# Patient Record
Sex: MALE | Race: WHITE | Employment: FULL TIME | URBAN - METROPOLITAN AREA
[De-identification: names, ages, dates, MRNs, and addresses within clinical notes are randomized per-mention and may not be internally consistent; named-entity substitution may affect disease eponyms.]

---

## 2021-09-17 ENCOUNTER — ANESTHESIA EVENT (OUTPATIENT)
Dept: ENDOSCOPY | Age: 51
End: 2021-09-17
Payer: COMMERCIAL

## 2021-09-20 ENCOUNTER — HOSPITAL ENCOUNTER (OUTPATIENT)
Age: 51
Setting detail: OUTPATIENT SURGERY
Discharge: HOME | End: 2021-09-20
Attending: INTERNAL MEDICINE | Admitting: INTERNAL MEDICINE
Payer: COMMERCIAL

## 2021-09-20 ENCOUNTER — ANESTHESIA (OUTPATIENT)
Dept: ENDOSCOPY | Age: 51
End: 2021-09-20
Payer: COMMERCIAL

## 2021-09-20 VITALS — SYSTOLIC BLOOD PRESSURE: 145 MMHG | DIASTOLIC BLOOD PRESSURE: 79 MMHG | OXYGEN SATURATION: 99 %

## 2021-09-20 VITALS
DIASTOLIC BLOOD PRESSURE: 73 MMHG | TEMPERATURE: 98.1 F | BODY MASS INDEX: 37.22 KG/M2 | WEIGHT: 260 LBS | HEART RATE: 85 BPM | SYSTOLIC BLOOD PRESSURE: 132 MMHG | OXYGEN SATURATION: 98 % | RESPIRATION RATE: 18 BRPM | HEIGHT: 70 IN

## 2021-09-20 DIAGNOSIS — K92.1 MELENA: ICD-10-CM

## 2021-09-20 DIAGNOSIS — C14.0 THROAT CANCER (HCC): ICD-10-CM

## 2021-09-20 DIAGNOSIS — D50.9 IRON DEFICIENCY ANEMIA, UNSPECIFIED IRON DEFICIENCY ANEMIA TYPE: ICD-10-CM

## 2021-09-20 DIAGNOSIS — R19.7 DIARRHEA, UNSPECIFIED TYPE: ICD-10-CM

## 2021-09-20 DIAGNOSIS — R12 HEARTBURN: ICD-10-CM

## 2021-09-20 LAB
GLUCOSE BLD-MCNC: 187 MG/DL (ref 70–99)
PERFORMED ON: ABNORMAL
SARS-COV-2, NAAT: NOT DETECTED

## 2021-09-20 PROCEDURE — 3700000001 HC ADD 15 MINUTES (ANESTHESIA): Performed by: INTERNAL MEDICINE

## 2021-09-20 PROCEDURE — 3700000000 HC ANESTHESIA ATTENDED CARE: Performed by: INTERNAL MEDICINE

## 2021-09-20 PROCEDURE — 3609012400 HC EGD TRANSORAL BIOPSY SINGLE/MULTIPLE: Performed by: INTERNAL MEDICINE

## 2021-09-20 PROCEDURE — 88305 TISSUE EXAM BY PATHOLOGIST: CPT

## 2021-09-20 PROCEDURE — 87635 SARS-COV-2 COVID-19 AMP PRB: CPT

## 2021-09-20 PROCEDURE — 2580000003 HC RX 258: Performed by: ANESTHESIOLOGY

## 2021-09-20 PROCEDURE — 7100000010 HC PHASE II RECOVERY - FIRST 15 MIN: Performed by: INTERNAL MEDICINE

## 2021-09-20 PROCEDURE — 3609027000 HC COLONOSCOPY: Performed by: INTERNAL MEDICINE

## 2021-09-20 PROCEDURE — 6360000002 HC RX W HCPCS: Performed by: NURSE ANESTHETIST, CERTIFIED REGISTERED

## 2021-09-20 PROCEDURE — 2709999900 HC NON-CHARGEABLE SUPPLY: Performed by: INTERNAL MEDICINE

## 2021-09-20 PROCEDURE — 7100000011 HC PHASE II RECOVERY - ADDTL 15 MIN: Performed by: INTERNAL MEDICINE

## 2021-09-20 RX ORDER — SODIUM CHLORIDE, SODIUM LACTATE, POTASSIUM CHLORIDE, CALCIUM CHLORIDE 600; 310; 30; 20 MG/100ML; MG/100ML; MG/100ML; MG/100ML
INJECTION, SOLUTION INTRAVENOUS CONTINUOUS
Status: DISCONTINUED | OUTPATIENT
Start: 2021-09-20 | End: 2021-09-20 | Stop reason: HOSPADM

## 2021-09-20 RX ORDER — INSULIN GLARGINE 100 [IU]/ML
INJECTION, SOLUTION SUBCUTANEOUS NIGHTLY
COMMUNITY

## 2021-09-20 RX ORDER — PROPOFOL 10 MG/ML
INJECTION, EMULSION INTRAVENOUS PRN
Status: DISCONTINUED | OUTPATIENT
Start: 2021-09-20 | End: 2021-09-20 | Stop reason: SDUPTHER

## 2021-09-20 RX ORDER — M-VIT,TX,IRON,MINS/CALC/FOLIC 27MG-0.4MG
1 TABLET ORAL DAILY
COMMUNITY

## 2021-09-20 RX ORDER — LIDOCAINE HYDROCHLORIDE 20 MG/ML
INJECTION, SOLUTION INTRAVENOUS PRN
Status: DISCONTINUED | OUTPATIENT
Start: 2021-09-20 | End: 2021-09-20 | Stop reason: SDUPTHER

## 2021-09-20 RX ORDER — LISINOPRIL 5 MG/1
5 TABLET ORAL DAILY
COMMUNITY

## 2021-09-20 RX ADMIN — LIDOCAINE HYDROCHLORIDE 100 MG: 20 INJECTION, SOLUTION INTRAVENOUS at 10:31

## 2021-09-20 RX ADMIN — PROPOFOL 100 MG: 10 INJECTION, EMULSION INTRAVENOUS at 10:43

## 2021-09-20 RX ADMIN — SODIUM CHLORIDE, POTASSIUM CHLORIDE, SODIUM LACTATE AND CALCIUM CHLORIDE: 600; 310; 30; 20 INJECTION, SOLUTION INTRAVENOUS at 08:54

## 2021-09-20 RX ADMIN — PROPOFOL 100 MG: 10 INJECTION, EMULSION INTRAVENOUS at 10:46

## 2021-09-20 RX ADMIN — PROPOFOL 200 MG: 10 INJECTION, EMULSION INTRAVENOUS at 10:31

## 2021-09-20 RX ADMIN — PROPOFOL 200 MG: 10 INJECTION, EMULSION INTRAVENOUS at 10:33

## 2021-09-20 ASSESSMENT — PAIN - FUNCTIONAL ASSESSMENT
PAIN_FUNCTIONAL_ASSESSMENT: 0-10

## 2021-09-20 ASSESSMENT — PULMONARY FUNCTION TESTS
PIF_VALUE: 1

## 2021-09-20 ASSESSMENT — LIFESTYLE VARIABLES: SMOKING_STATUS: 0

## 2021-09-20 NOTE — ANESTHESIA PRE PROCEDURE
Department of Anesthesiology  Preprocedure Note       Name:  Fito Meade   Age:  44 y.o.  :  1970                                          MRN:  [id number]         Date:  2021      Surgeon: Delores Traylor):  Carlos Munoz MD    Procedure: Procedure(s):  ESOPHAGOGASTRODUODENOSCOPY  COLONOSCOPY DIAGNOSTIC    Medications prior to admission:   Prior to Admission medications    Not on File       Current medications:    Current Facility-Administered Medications   Medication Dose Route Frequency Provider Last Rate Last Admin    lactated ringers infusion   IntraVENous Continuous Marten Shasih, DO        lactated ringers infusion   IntraVENous Continuous Marten Shashi, DO           Allergies:  No Known Allergies    Problem List:  There is no problem list on file for this patient. Past Medical History:  No past medical history on file. Past Surgical History:  No past surgical history on file. Social History:    Social History     Tobacco Use    Smoking status: Not on file   Substance Use Topics    Alcohol use: Not on file                                Counseling given: Not Answered      Vital Signs (Current): There were no vitals filed for this visit. BP Readings from Last 3 Encounters:   No data found for BP       NPO Status:                                                                                 BMI:   Wt Readings from Last 3 Encounters:   No data found for Wt     There is no height or weight on file to calculate BMI.    CBC: No results found for: WBC, RBC, HGB, HCT, MCV, RDW, PLT    CMP: No results found for: NA, K, CL, CO2, BUN, CREATININE, GFRAA, AGRATIO, LABGLOM, GLUCOSE, PROT, CALCIUM, BILITOT, ALKPHOS, AST, ALT    POC Tests: No results for input(s): POCGLU, POCNA, POCK, POCCL, POCBUN, POCHEMO, POCHCT in the last 72 hours.     Coags: No results found for: PROTIME, INR, APTT    HCG (If Applicable): No results found for: PREGTESTUR, PREGSERUM, HCG, HCGQUANT     ABGs: No results found for: PHART, PO2ART, ATX0NXI, TRT8NZK, BEART, B7ZLGMJH     Type & Screen (If Applicable):  No results found for: LABABO, LABRH    Drug/Infectious Status (If Applicable):  No results found for: HIV, HEPCAB    COVID-19 Screening (If Applicable): No results found for: COVID19        Anesthesia Evaluation  Patient summary reviewed and Nursing notes reviewed no history of anesthetic complications:   Airway: Mallampati: II  TM distance: >3 FB   Neck ROM: limited  Mouth opening: > = 3 FB Dental:    (+) edentulous      Pulmonary:       (-) not a current smoker                          ROS comment: Quit smoking 9 months ago   Cardiovascular:Negative CV ROS            Rhythm: regular  Rate: normal                    Neuro/Psych:   Negative Neuro/Psych ROS              GI/Hepatic/Renal:            ROS comment: GIB. Endo/Other:    (+) DiabetesType II DM, using insulin, malignancy/cancer. ROS comment: Recently diagnosed head and neck CA  Starting radiation this week Abdominal:             Vascular: negative vascular ROS. Other Findings:           Anesthesia Plan      MAC     ASA 3       Induction: intravenous. MIPS: Prophylactic antiemetics administered. Anesthetic plan and risks discussed with patient. Plan discussed with CRNA.                   Quincy Marlow, DO   9/20/2021

## 2021-09-20 NOTE — H&P
History and Physical / Pre-Sedation Assessment    Patient:  Poly Rahman   :   1970     Intended Procedure:  Melena, Anemia, Throat Ca, + FIT    HPI: GERD 1 yr, Melena 1 yr, Recent FIT +    Nurses notes reviewed and agreed. Medications reviewed  Allergies: No Known Allergies    Physical Exam:  Vital Signs: BP (!) 148/79   Pulse 85   Temp 98.1 °F (36.7 °C) (Oral)   Resp 20   Ht 5' 10\" (1.778 m)   Wt 260 lb (117.9 kg)   SpO2 99%   BMI 37.31 kg/m²    Airway: Mallampati: II (soft palate, uvula, fauces visible)  Pulmonary:Normal  Cardiac:Normal  Abdomen:Normal    Pre-Procedure Assessment / Plan:  ASA: Class 2 - A normal healthy patient with mild systemic disease  Level of Sedation Plan: per anesthesia  Post Procedure plan: Return to same level of care    I assessed the patient and find that the patient is in satisfactory condition to proceed with the planned procedure and sedation plan. I have explained the risk, benefits, and alternatives to the procedure; the patient understands and agrees to proceed.        Ruben Lilly MD  2021

## 2021-09-20 NOTE — ANESTHESIA POSTPROCEDURE EVALUATION
Department of Anesthesiology  Postprocedure Note    Patient: Bria Robetrs  MRN: [id number]  [place]: 1970  Date of evaluation: 9/20/2021  Time:  11:35 AM     Procedure Summary     Date: 09/20/21 Room / Location: [facility]    Anesthesia Start: 1026 Anesthesia Stop: 4954    Procedures:       EGD BIOPSY (N/A )      COLONOSCOPY DIAGNOSTIC (N/A ) Diagnosis:       Heartburn      Iron deficiency anemia, unspecified iron deficiency anemia type      Melena      Diarrhea, unspecified type      Throat cancer (HCC)      (Heartburn [R12] Iron deficiency anemia, unspecifiedConstipation, unspecified constipation type [K59.00] iron deficiency anemia type [D50.9] Melena [K92.1] Diarrhea, unspecified type [R19.7] Stage II Throat Cancer)    Surgeons: Georgiana Ann MD Responsible Provider: Rufino Rivera DO    Anesthesia Type: MAC ASA Status: 3          Anesthesia Type: MAC    Josephine Phase I: Josephine Score: 10    Josephine Phase II:      Last vitals: Reviewed and per EMR flowsheets.        Anesthesia Post Evaluation    Patient location during evaluation: PACU  Patient participation: complete - patient participated  Level of consciousness: awake and alert  Airway patency: patent  Nausea & Vomiting: no nausea and no vomiting  Cardiovascular status: blood pressure returned to baseline  Respiratory status: acceptable  Hydration status: euvolemic

## 2021-09-21 NOTE — PROCEDURES
[address]                [address] [facility], [address] [place] [address] Ave                                 PROCEDURE NOTE    PATIENT NAME: TAYLOR ANDRE                          :        1970  MED REC NO:   [id number]                          ROOM:  ACCOUNT NO:   [de-identified]                           ADMIT DATE: 2021  PROVIDER:     Vikas Conde MD    DATE OF PROCEDURE:  2021    ENDOSCOPY REPORT    He was an outpatient. PROCEDURES PERFORMED:  EGD with biopsy x1 and pictures followed by  colonoscopy to cecum with cold biopsy polypectomy x3 and pictures. SURGEON:  Vikas Conde MD    INDICATION FOR PROCEDURE:  This is a 57-year-old white male who recently  saw me in the GI Clinic in [facility] with main symptoms of anemia,  occasional black stools, recently diagnosed throat cancer for which he  had surgery at [facility]. Rectal bleeding at times and  alternating constipation and diarrhea. He has never had screening  evaluation of colon, so this would serve as a screening colonoscopy as  well. ANALGESIA:  Analgesia was provided by anesthesia service. Please see  their note. PROCEDURE NOTE:  The patient had given informed consent earlier and was  monitored in a standard fashion. EGD was done first.    EGD NOTE:  In a fasting state in the left lateral decubitus position, an  Olympus upper GI video endoscope was advanced from the oropharynx to the  second portion of duodenum. There was no major difficulty doing it. The patient's esophagus showed early esophageal varices. These may be  related to the patient's prior history of hepatitis C. He denies  alcohol drinking. The stomach also showed fairly diffuse redness which  is consistent with his portal systemic gastropathy; however, distally,  there appeared to be acute erosive changes, so biopsies were done. The  pylorus was symmetrical and widely open.   The duodenum was normal. There was no active bleeding, but it was noted that biopsy sites tended  to ooze. His recent platelet count was 67,310 which is acceptable  although I do not have coagulation profile. I would estimate the blood  loss at less than 1 mL. The patient tolerated the exam well. He was  then turned around for colonoscopy. COLONOSCOPY REPORT:  He had been cleaned out as usual with clear liquid  diet and MiraLax prep. In general, colon prep was adequate. Using an  Olympus video colonoscope, colonoscopy was carried out without major  difficulty to the cecal area. Placement of the tip of the scope in the  cecum was confirmed by appearance of ileocecal valve and appendiceal  opening although transillumination was not possible. He has a somewhat  longish colon and pressure had to be applied to undo the loops. He has  prominent internal hemorrhoids without bleeding. There was a 5-mm polyp  in the sigmoid colon with two smaller polyps nearby. All were removed  using cold biopsy forceps. Once again, there was oozing and I would  estimate the blood loss to be again less than 1 to 2 mL. No convincing  diverticular disease was seen. The rest of the examination to the cecum  failed to show any significant changes. In particular, no larger  polyps, tumors, obstructing or constricting lesions, angiodysplasia,  active bleeding, or diverticular disease was seen. He tolerated the  exam well. IMPRESSION:  1. Early esophageal varices in this patient who is known to have  hepatitis C.  2.  Acute erosive gastritis in the antrum. Biopsies done. 3.  Diffuse gastritis versus portal systemic gastropathy. 4.  Normal duodenum. No active bleeding. 5. Internal hemorrhoids without bleeding. 6.  Small sigmoid polyps x3. Cold biopsy polypectomy done and awaited. Otherwise, grossly normal colonoscopy to cecum. Please see comments. PLAN:  Above was explained to the patient when he was awake.   Findings  were discussed with his wife. The patient has been advised to follow up  with me in the GI Clinic in two weeks' time to discuss the endoscopy and  biopsy reports. This is obviously in [place]. At that time, further  workup will be carried out with regards to his liver and I will also try  to obtain old records of his previous workup. Katerina Quevedo MD    D: 09/20/2021 18:03:28       T: 09/20/2021 23:23:28     [id number]  Job#: [id number]     Doc#: [id number]    CC:   Rashad Patel NP

## (undated) DEVICE — FORCEPS BX L240CM JAW DIA2.8MM L CAP W/ NDL MIC MESH TOOTH

## (undated) DEVICE — MASK CAPNOGRAPHY AD W35IN DIA58IN SAMP LN L10FT O2 LN